# Patient Record
(demographics unavailable — no encounter records)

---

## 2024-10-15 NOTE — PLAN
[Provision of National Suicide Prevention Lifeline 0-548-582-TALK (1955)] : Provision of national suicide prevention lifeline 3-342-282-talk (4964) [Family] : family [None on Record] : none on record [TextBox_9] : referral to psychiatrist and therapist. mother would like to switch from NP to a psychiatrist as she feels that his symptoms have not been adequately controlled this past year. unclear why pt has not been trialed on stimulant because the most concerning symptom appears to be poor impulse control from ADHD. will also refer to therapy. would recommend a CBT and skill training approach  [TextBox_11] : continue for now, but discussed with mother that new psychiatrist should evaluate the need for each medication and consider starting stimulant.  [TextBox_13] : no safety concerns. no guns at home. family should call 911 or go to nearest ER or any acute safety concerns [TextBox_26] : mother signed school consent. secure email sent to [email or web address]

## 2024-10-15 NOTE — PLAN
[Provision of National Suicide Prevention Lifeline 5-941-720-TALK (2195)] : Provision of national suicide prevention lifeline 5-559-438-talk (8110) [Family] : family [None on Record] : none on record [TextBox_9] : referral to psychiatrist and therapist. mother would like to switch from NP to a psychiatrist as she feels that his symptoms have not been adequately controlled this past year. unclear why pt has not been trialed on stimulant because the most concerning symptom appears to be poor impulse control from ADHD. will also refer to therapy. would recommend a CBT and skill training approach  [TextBox_11] : continue for now, but discussed with mother that new psychiatrist should evaluate the need for each medication and consider starting stimulant.  [TextBox_13] : no safety concerns. no guns at home. family should call 911 or go to nearest ER or any acute safety concerns [TextBox_26] : mother signed school consent. secure email sent to [email or web address]

## 2024-10-15 NOTE — PHYSICAL EXAM
[Unremarkable/age appropriate] : unremarkable/age appropriate [Normal] : normal [None] : none [Cooperative] : cooperative [Euthymic] : euthymic [Full] : full [Clear] : clear [Linear/Goal Directed] : linear/goal directed [Average] : average [WNL] : within normal limits [Positive interaction] : positive interaction [Clingy to parent/guardian, but separates] : clingy to parent/guardian, but separates [de-identified] : some inattention noted  [de-identified] : stutter

## 2024-10-15 NOTE — HISTORY OF PRESENT ILLNESS
[Not Applicable] : Not applicable [FreeTextEntry1] : 13yo boy, domiciled with parents and sister, 5th grader at [employer], in [place] receiving counseling, PT, OT and speech, no significant pmhx, pphx of ADHD, anxiety, LD, currently receiving medication management at [facility] psychiatry with an NP, has not been in therapy since June 2024, no h/o inpatient admissions, no SA, no sib, no violence hx, no legal hx, no substance use, no cps involvement, BIB mother, referred by school for connection to new therapist for impulse control issues and emotional dysregulation.   pt was calm and cooperative during the assessment. at times he would get distracted and  his phone, look at it for a few moments, and then place it face down away from him. he states that he is here today because of "anxiety and temper." he explains that he is anxious about "everything." transitioning to middle school has been a significant cause of stress. he has catastrophic thinking about his academic performance. he struggles with friendships and says that people do not like him because he "calls out" in class too much and because he "is cocky" about athletic abilities. he does endorse anxiety about his performance in football. he in general, he expects the worst outcomes. pt denies any social anxiety symptoms. he denies panic attacks. he denies fears of family or himself getting hurt.   in terms of his "temper" he states that he has a "low tolerance" of other people saying something negative about him, or even joking/poking fun at him. he admits to yelling and being "disrespectful" toward adults when frustrated. he has a hard time self-regulating and states that his outbursts can last for 10min to an hour. music, walking, going on the swings were identified as his coping skills. pt always feels badly after an outburst. he denies ever becoming physically aggressive toward others or destroying property. he denies ever having HI. denies aggressive thoughts toward others. no access to guns.   pt continues to discuss issues with impulse control. he states that when he talks back and calls out, he feels like he "can't control it." he states that he will call out whatever he is thinking in class. he often forgets things/leaves things behind in the classroom or at home. his room is messy. he shoves things into his backpack instead of putting papers away nicely. he has trouble with organization. he fidgets often. he difficulties with focusing.   pt states that he does get occasional low moods that are triggered by him getting in trouble or someone being mean to him. he sleeps well at night but has some trouble waking up in the morning despite sleeping a good number of hours. he eats well and typically has lots of energy. he denies ever experiencing any si/i/p, engaging in nssib or having a SA. no manic symptoms of decreased need for sleep, increased goal directed activity or grandiosity. were reported. no psychotic symptoms of avh or paranoid delusions reported. no substance use. Pt denies history of abuse/trauma. he does state that sometimes his father gets upset and has a "temper" like him, but he denies ever any physical aggression. pt feels safe at home.   of note, after the appointment pt went back to the waiting room. writer and  were whispering a question regarding an upcoming appointment without using any patient identifiers. pt stood up and loudly asked why we were talking about him. writer and  staff assured the pt that we were not whispering about him. pt was able to share that people do whisper about him at school and that makes him feel sad. pt was then able to distract himself with his phone.   Collateral information obtained from mother by [facility]. 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 Patient went to [facility] for  and family chose to hold him back to start  at age 6. Patient was dx with ADHD, anxiety and learning disorder within the last few years. Regarding ADHD, mom feels patient struggles with all executive functioning skills, specifically noting struggles with focus, time management, organization skills, multistep directions and impulsive behaviors. She reports patient has low self esteem regarding low muscle tone and studder, she feels he wants to be liked by peers and will say things to fit in/tries to get attention from peers in the wrong ways at school. Mom feels behaviors are impacting his ability to make meaningful friendships. Patient struggles to deal with teasing from peers and mom would like for him to learn more coping strategies.  She reports they are here because the patient has difficulties regulating his emotions. Mom explains that patient can be rigid and is triggered easily, some triggers include limit setting, being redirected, being asked to do things he doesn't want to do, or if he feels he is being disrespected. When triggered patient has low frustration tolerance and can present as reactive, argumentative and oppositional, which affects him both at home and school. Almost daily issues at home, intermittent with school. Mom denies he is aggressive towards people or property. Mom says patient always has remorse after outbursts and engages in negative self talk, "I'm stupid I can't control myself". She denies concerns for depression but notes he overly focuses on the negatives. Notes he had sleep study done 6 years ago with no significant findings. Denies issues with appetite, interest, energy or motivation. Denies hx NSSIB, once made suicidal comment when dysregulated about wanting to be with a grandmother who passed away. Mom reports patient worries a lot and engages in catastrophic thinking. She specifically notes when he is sick, he worries he will die, also notes extreme anxiety with needles. Mom denies acute safety concerns.  [FreeTextEntry2] : pt had been seeing a therapist for several years. mother states that the therapist was an analyst. pt began seeing NP at [facility] about a year ago. pt has been on the same medications for about a year, but lamictal was just increased from 50mg to 75mg daily 2 weeks ago. prior to current regimen of lamictal, guanfacine and cymbalta, pt had a trial of zoloft. he went up to 100mg daily without any effect. strattera was also not effective. gene testing was done and this current regimen was created due to pt metabolizing these medications well according to the test. mother states that she is not seeing any improvements with this regimen. mother was asked why a trial of stimulants was never started, and she states that NP did not want to switch/start that class of medications because he was already on several different classes of medications.  [FreeTextEntry3] : see above

## 2024-10-15 NOTE — PLAN
[Provision of National Suicide Prevention Lifeline 3-154-777-TALK (5391)] : Provision of national suicide prevention lifeline 6-097-121-talk (2375) [Family] : family [None on Record] : none on record [TextBox_9] : referral to psychiatrist and therapist. mother would like to switch from NP to a psychiatrist as she feels that his symptoms have not been adequately controlled this past year. unclear why pt has not been trialed on stimulant because the most concerning symptom appears to be poor impulse control from ADHD. will also refer to therapy. would recommend a CBT and skill training approach  [TextBox_11] : continue for now, but discussed with mother that new psychiatrist should evaluate the need for each medication and consider starting stimulant.  [TextBox_13] : no safety concerns. no guns at home. family should call 911 or go to nearest ER or any acute safety concerns [TextBox_26] : mother signed school consent. secure email sent to [email or web address]

## 2024-10-15 NOTE — RISK ASSESSMENT
[Supportive social network of family or friends] : supportive social network of family or friends [FreeTextEntry4] : does yell and speak disrespectfully  [Clinical Interview] : Clinical Interview [Collateral Sources] : Collateral Sources [No] : No [ADHD] : ADHD [Severe anxiety, agitation or panic] : severe anxiety, agitation or panic [History of Impulsivity] : history of impulsivity [Triggering events leading to humiliation, shame, and/or despair] : triggering events leading to humiliation, shame, and/or despair (e.g. loss of relationship, financial or health status) (real or anticipated) [Identifies reasons for living] : identifies reasons for living [Positive therapeutic relationships] : positive therapeutic relationships [Responsibility to children, family, or others] : responsibility to children, family, or others [Engaged in work or school] : engaged in work or school [None in the patient's lifetime] : None in the patient's lifetime [None Known] : none known [Impulsivity] : impulsivity [Residential stability] : residential stability [Relationship stability] : relationship stability [Sobriety] : sobriety [Yes] : yes [de-identified] : no access to guns

## 2024-10-15 NOTE — HISTORY OF PRESENT ILLNESS
[Not Applicable] : Not applicable [FreeTextEntry1] : 11yo boy, domiciled with parents and sister, 5th grader at [employer], in [place] with [place] receiving counseling, PT, OT and speech, no significant pmhx, pphx of ADHD, anxiety, LD, currently receiving medication management at [facility] psychiatry with an NP, has not been in therapy since June 2024, no h/o inpatient admissions, no SA, no sib, no violence hx, no legal hx, no substance use, no cps involvement, BIB mother, referred by school for connection to new therapist for impulse control issues and emotional dysregulation.   pt was calm and cooperative during the assessment. at times he would get distracted and  his phone, look at it for a few moments, and then place it face down away from him. he states that he is here today because of "anxiety and temper." he explains that he is anxious about "everything." transitioning to middle school has been a significant cause of stress. he has catastrophic thinking about his academic performance. he struggles with friendships and says that people do not like him because he "calls out" in class too much and because he "is cocky" about athletic abilities. he does endorse anxiety about his performance in football. he in general, he expects the worst outcomes. pt denies any social anxiety symptoms. he denies panic attacks. he denies fears of family or himself getting hurt.   in terms of his "temper" he states that he has a "low tolerance" of other people saying something negative about him, or even joking/poking fun at him. he admits to yelling and being "disrespectful" toward adults when frustrated. he has a hard time self-regulating and states that his outbursts can last for 10min to an hour. music, walking, going on the swings were identified as his coping skills. pt always feels badly after an outburst. he denies ever becoming physically aggressive toward others or destroying property. he denies ever having HI. denies aggressive thoughts toward others. no access to guns.   pt continues to discuss issues with impulse control. he states that when he talks back and calls out, he feels like he "can't control it." he states that he will call out whatever he is thinking in class. he often forgets things/leaves things behind in the classroom or at home. his room is messy. he shoves things into his backpack instead of putting papers away nicely. he has trouble with organization. he fidgets often. he difficulties with focusing.   pt states that he does get occasional low moods that are triggered by him getting in trouble or someone being mean to him. he sleeps well at night but has some trouble waking up in the morning despite sleeping a good number of hours. he eats well and typically has lots of energy. he denies ever experiencing any si/i/p, engaging in nssib or having a SA. no manic symptoms of decreased need for sleep, increased goal directed activity or grandiosity. were reported. no psychotic symptoms of avh or paranoid delusions reported. no substance use. Pt denies history of abuse/trauma. he does state that sometimes his father gets upset and has a "temper" like him, but he denies ever any physical aggression. pt feels safe at home.   of note, after the appointment pt went back to the waiting room. writer and  were whispering a question regarding an upcoming appointment without using any patient identifiers. pt stood up and loudly asked why we were talking about him. writer and  staff assured the pt that we were not whispering about him. pt was able to share that people do whisper about him at school and that makes him feel sad. pt was then able to distract himself with his phone.   Collateral information obtained from mother by [facility]. 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 Patient went to [facility] for  and family chose to hold him back to start  at age 6. Patient was dx with ADHD, anxiety and learning disorder within the last few years. Regarding ADHD, mom feels patient struggles with all executive functioning skills, specifically noting struggles with focus, time management, organization skills, multistep directions and impulsive behaviors. She reports patient has low self esteem regarding low muscle tone and studder, she feels he wants to be liked by peers and will say things to fit in/tries to get attention from peers in the wrong ways at school. Mom feels behaviors are impacting his ability to make meaningful friendships. Patient struggles to deal with teasing from peers and mom would like for him to learn more coping strategies.  She reports they are here because the patient has difficulties regulating his emotions. Mom explains that patient can be rigid and is triggered easily, some triggers include limit setting, being redirected, being asked to do things he doesn't want to do, or if he feels he is being disrespected. When triggered patient has low frustration tolerance and can present as reactive, argumentative and oppositional, which affects him both at home and school. Almost daily issues at home, intermittent with school. Mom denies he is aggressive towards people or property. Mom says patient always has remorse after outbursts and engages in negative self talk, "I'm stupid I can't control myself". She denies concerns for depression but notes he overly focuses on the negatives. Notes he had sleep study done 6 years ago with no significant findings. Denies issues with appetite, interest, energy or motivation. Denies hx NSSIB, once made suicidal comment when dysregulated about wanting to be with a grandmother who passed away. Mom reports patient worries a lot and engages in catastrophic thinking. She specifically notes when he is sick, he worries he will die, also notes extreme anxiety with needles. Mom denies acute safety concerns.  [FreeTextEntry2] : pt had been seeing a therapist for several years. mother states that the therapist was an analyst. pt began seeing NP at [facility] about a year ago. pt has been on the same medications for about a year, but lamictal was just increased from 50mg to 75mg daily 2 weeks ago. prior to current regimen of lamictal, guanfacine and cymbalta, pt had a trial of zoloft. he went up to 100mg daily without any effect. strattera was also not effective. gene testing was done and this current regimen was created due to pt metabolizing these medications well according to the test. mother states that she is not seeing any improvements with this regimen. mother was asked why a trial of stimulants was never started, and she states that NP did not want to switch/start that class of medications because he was already on several different classes of medications.  [FreeTextEntry3] : see above

## 2024-10-15 NOTE — PHYSICAL EXAM
[Unremarkable/age appropriate] : unremarkable/age appropriate [Normal] : normal [None] : none [Cooperative] : cooperative [Euthymic] : euthymic [Full] : full [Clear] : clear [Linear/Goal Directed] : linear/goal directed [Average] : average [WNL] : within normal limits [Positive interaction] : positive interaction [Clingy to parent/guardian, but separates] : clingy to parent/guardian, but separates [de-identified] : some inattention noted  [de-identified] : stutter

## 2024-10-15 NOTE — HISTORY OF PRESENT ILLNESS
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   pt was calm and cooperative during the assessment. at times he would get distracted and  his phone, look at it for a few moments, and then place it face down away from him. he states that he is here today because of "anxiety and temper." he explains that he is anxious about "everything." transitioning to middle school has been a significant cause of stress. he has catastrophic thinking about his academic performance. he struggles with friendships and says that people do not like him because he "calls out" in class too much and because he "is cocky" about athletic abilities. he does endorse anxiety about his performance in football. he in general, he expects the worst outcomes. pt denies any social anxiety symptoms. he denies panic attacks. he denies fears of family or himself getting hurt.   in terms of his "temper" he states that he has a "low tolerance" of other people saying something negative about him, or even joking/poking fun at him. he admits to yelling and being "disrespectful" toward adults when frustrated. he has a hard time self-regulating and states that his outbursts can last for 10min to an hour. music, walking, going on the swings were identified as his coping skills. pt always feels badly after an outburst. he denies ever becoming physically aggressive toward others or destroying property. he denies ever having HI. denies aggressive thoughts toward others. no access to guns.   pt continues to discuss issues with impulse control. he states that when he talks back and calls out, he feels like he "can't control it." he states that he will call out whatever he is thinking in class. he often forgets things/leaves things behind in the classroom or at home. his room is messy. he shoves things into his backpack instead of putting papers away nicely. he has trouble with organization. he fidgets often. he difficulties with focusing.   pt states that he does get occasional low moods that are triggered by him getting in trouble or someone being mean to him. he sleeps well at night but has some trouble waking up in the morning despite sleeping a good number of hours. he eats well and typically has lots of energy. he denies ever experiencing any si/i/p, engaging in nssib or having a SA. no manic symptoms of decreased need for sleep, increased goal directed activity or grandiosity. were reported. no psychotic symptoms of avh or paranoid delusions reported. no substance use. Pt denies history of abuse/trauma. he does state that sometimes his father gets upset and has a "temper" like him, but he denies ever any physical aggression. pt feels safe at home.   of note, after the appointment pt went back to the waiting room. writer and  were whispering a question regarding an upcoming appointment without using any patient identifiers. pt stood up and loudly asked why we were talking about him. writer and  staff assured the pt that we were not whispering about him. pt was able to share that people do whisper about him at school and that makes him feel sad. pt was then able to distract himself with his phone.   Collateral information obtained from mother by [facility]. 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 Patient went to [facility] for  and family chose to hold him back to start  at age 6. Patient was dx with ADHD, anxiety and learning disorder within the last few years. Regarding ADHD, mom feels patient struggles with all executive functioning skills, specifically noting struggles with focus, time management, organization skills, multistep directions and impulsive behaviors. She reports patient has low self esteem regarding low muscle tone and studder, she feels he wants to be liked by peers and will say things to fit in/tries to get attention from peers in the wrong ways at school. Mom feels behaviors are impacting his ability to make meaningful friendships. Patient struggles to deal with teasing from peers and mom would like for him to learn more coping strategies.  She reports they are here because the patient has difficulties regulating his emotions. Mom explains that patient can be rigid and is triggered easily, some triggers include limit setting, being redirected, being asked to do things he doesn't want to do, or if he feels he is being disrespected. When triggered patient has low frustration tolerance and can present as reactive, argumentative and oppositional, which affects him both at home and school. Almost daily issues at home, intermittent with school. Mom denies he is aggressive towards people or property. Mom says patient always has remorse after outbursts and engages in negative self talk, "I'm stupid I can't control myself". She denies concerns for depression but notes he overly focuses on the negatives. Notes he had sleep study done 6 years ago with no significant findings. Denies issues with appetite, interest, energy or motivation. Denies hx NSSIB, once made suicidal comment when dysregulated about wanting to be with a grandmother who passed away. Mom reports patient worries a lot and engages in catastrophic thinking. She specifically notes when he is sick, he worries he will die, also notes extreme anxiety with needles. Mom denies acute safety concerns.  [FreeTextEntry2] : pt had been seeing a therapist for several years. mother states that the therapist was an analyst. pt began seeing NP at [facility] about a year ago. pt has been on the same medications for about a year, but lamictal was just increased from 50mg to 75mg daily 2 weeks ago. prior to current regimen of lamictal, guanfacine and cymbalta, pt had a trial of zoloft. he went up to 100mg daily without any effect. strattera was also not effective. gene testing was done and this current regimen was created due to pt metabolizing these medications well according to the test. mother states that she is not seeing any improvements with this regimen. mother was asked why a trial of stimulants was never started, and she states that NP did not want to switch/start that class of medications because he was already on several different classes of medications.  [FreeTextEntry3] : see above

## 2024-10-15 NOTE — RISK ASSESSMENT
[Supportive social network of family or friends] : supportive social network of family or friends [FreeTextEntry4] : does yell and speak disrespectfully  [Clinical Interview] : Clinical Interview [Collateral Sources] : Collateral Sources [No] : No [ADHD] : ADHD [Severe anxiety, agitation or panic] : severe anxiety, agitation or panic [History of Impulsivity] : history of impulsivity [Triggering events leading to humiliation, shame, and/or despair] : triggering events leading to humiliation, shame, and/or despair (e.g. loss of relationship, financial or health status) (real or anticipated) [Identifies reasons for living] : identifies reasons for living [Positive therapeutic relationships] : positive therapeutic relationships [Responsibility to children, family, or others] : responsibility to children, family, or others [Engaged in work or school] : engaged in work or school [None in the patient's lifetime] : None in the patient's lifetime [None Known] : none known [Impulsivity] : impulsivity [Residential stability] : residential stability [Relationship stability] : relationship stability [Sobriety] : sobriety [Yes] : yes [de-identified] : no access to guns

## 2024-10-15 NOTE — DISCUSSION/SUMMARY
[Low acute suicide risk] : Low acute suicide risk [No] : No [Not clinically indicated] : Safety Plan completed/updated (for individuals at risk): Not clinically indicated [FreeTextEntry1] : Risk factors: male gender,  anxiety symptoms, h/o ADHD, h/o agitation and poor impulse control     Protective factors: age, domiciled with supportive family, engaged in school, no prior SA or SIB, no current si/i/p, no h/o violence, no current hi/i/p, help-seeking, motivated for outpatient treatment, future oriented with short and long term goals, barriers to suicide, no access to guns,  not acutely manic or psychotic, no substance abuse, no trauma hx, no family history of suicide

## 2024-10-15 NOTE — PHYSICAL EXAM
[Unremarkable/age appropriate] : unremarkable/age appropriate [Normal] : normal [None] : none [Cooperative] : cooperative [Euthymic] : euthymic [Full] : full [Clear] : clear [Linear/Goal Directed] : linear/goal directed [Average] : average [WNL] : within normal limits [Positive interaction] : positive interaction [Clingy to parent/guardian, but separates] : clingy to parent/guardian, but separates [de-identified] : some inattention noted  [de-identified] : stutter

## 2024-10-15 NOTE — REASON FOR VISIT
[Behavioral Health Urgent Care Assessment] : a behavioral health urgent care assessment [School] : school [Patient] : patient [Mother] : with mother [TextBox_17] : connection to therapy for emotional dysregulation

## 2024-10-15 NOTE — RISK ASSESSMENT
[Supportive social network of family or friends] : supportive social network of family or friends [FreeTextEntry4] : does yell and speak disrespectfully  [Clinical Interview] : Clinical Interview [Collateral Sources] : Collateral Sources [No] : No [ADHD] : ADHD [Severe anxiety, agitation or panic] : severe anxiety, agitation or panic [History of Impulsivity] : history of impulsivity [Triggering events leading to humiliation, shame, and/or despair] : triggering events leading to humiliation, shame, and/or despair (e.g. loss of relationship, financial or health status) (real or anticipated) [Identifies reasons for living] : identifies reasons for living [Positive therapeutic relationships] : positive therapeutic relationships [Responsibility to children, family, or others] : responsibility to children, family, or others [Engaged in work or school] : engaged in work or school [None in the patient's lifetime] : None in the patient's lifetime [None Known] : none known [Impulsivity] : impulsivity [Residential stability] : residential stability [Relationship stability] : relationship stability [Sobriety] : sobriety [Yes] : yes [de-identified] : no access to guns